# Patient Record
Sex: FEMALE | Race: WHITE | NOT HISPANIC OR LATINO | Employment: UNEMPLOYED | URBAN - METROPOLITAN AREA
[De-identification: names, ages, dates, MRNs, and addresses within clinical notes are randomized per-mention and may not be internally consistent; named-entity substitution may affect disease eponyms.]

---

## 2024-10-29 ENCOUNTER — HOSPITAL ENCOUNTER (OUTPATIENT)
Dept: INTERVENTIONAL RADIOLOGY/VASCULAR | Facility: HOSPITAL | Age: 64
Discharge: HOME | End: 2024-10-29
Payer: COMMERCIAL

## 2024-10-29 DIAGNOSIS — N17.9 AKI (ACUTE KIDNEY INJURY): ICD-10-CM

## 2024-10-29 PROCEDURE — 25010000002 LIDOCAINE PF 1% 1 % SOLUTION

## 2024-10-29 RX ORDER — LIDOCAINE HYDROCHLORIDE 10 MG/ML
INJECTION, SOLUTION EPIDURAL; INFILTRATION; INTRACAUDAL; PERINEURAL
Status: COMPLETED
Start: 2024-10-29 | End: 2024-10-29

## 2024-10-29 RX ADMIN — LIDOCAINE HYDROCHLORIDE 8 ML: 10 INJECTION, SOLUTION EPIDURAL; INFILTRATION; INTRACAUDAL; PERINEURAL at 08:46

## 2024-10-29 NOTE — PRE-PROCEDURE NOTE
"[facility]   Vascular Interventional Radiology  History & Physicial        Patient Name:Heron Ge    : 1960    MRN: [id number]    Primary Care Physician: Provider, No Known    Referring Physician: Sterling Marsh, *     Date of admission: 10/29/2024    Subjective     Reason for Consult: TDC removal.    History of Present Illness     Heron Ge is a 64 y.o. female referred to IR as noted above.      Active Hospital Problems:  There are no active hospital problems to display for this patient.      Personal History     No past medical history on file.    No past surgical history on file.    Family History: Her family history is not on file.     Social History: She  has no history on file for tobacco use, alcohol use, and drug use.    Home Medications:       Current Medications:    lidocaine PF 1%     Allergies:  Not on File    Review of Systems    IR Procedure pertinent significant findings are mentioned in the PMH and PSH above.    Objective     There were no vitals taken for this visit.     Physical Exam    A&Ox3.   Able to communicate  No Apparent Distress  Average physique   CVS: VS as noted. Chart reviewed. Stable for the procedure.  Respiratory: Non labored breathing. No signs of respiratory compromise.    Result Review      I have personally reviewed the results from the time of this admission to 10/29/2024 08:20 EDT and agree with these findings.  [x]  Laboratory  []  Microbiology  [x]  Radiology  []  EKG/Telemetry   []  Cardiology/Vascular   []  Pathology  []  Old records  []  Other:    Most notable findings include: As noted:                      CrCl cannot be calculated (No successful lab value found.). No results found for: \"CREATININE\"    SARS-CoV-2, ANNIE   Date Value Ref Range Status   05/10/2024 Negative Negative Final        No results found for: \"PREGTESTUR\", \"PREGSERUM\", \"HCG\", \"HCGQUANT\"     ASA SCALE ASSESSMENT (applicable ONLY if sedation planned):        MALLAMPATI " CLASSIFICATION (applicable ONLY if sedation planned):       Assessment / Plan     Fnu Joo is a 64 y.o. female referred to the IR service with above problem.    Plan:   As above.    KEESHA De Leon   Vascular Interventional Radiology  10/29/24   8:20 AM EDT